# Patient Record
Sex: MALE | Race: BLACK OR AFRICAN AMERICAN
[De-identification: names, ages, dates, MRNs, and addresses within clinical notes are randomized per-mention and may not be internally consistent; named-entity substitution may affect disease eponyms.]

---

## 2018-01-19 LAB
BASOPHILS # BLD AUTO: 0.1 10*3/UL (ref 0–0.1)
BASOPHILS NFR BLD AUTO: 1.1 % (ref 0–1)
DEPRECATED NEUTROPHILS # BLD AUTO: 4 10*3/UL (ref 2.1–6.9)
EOSINOPHIL # BLD AUTO: 0.5 10*3/UL (ref 0–0.4)
EOSINOPHIL NFR BLD AUTO: 6.2 % (ref 0–6)
ERYTHROCYTE [DISTWIDTH] IN CORD BLOOD: 13.2 % (ref 11.7–14.4)
HCT VFR BLD AUTO: 49.5 % (ref 38.2–49.6)
HGB BLD-MCNC: 16.4 G/DL (ref 14–18)
LYMPHOCYTES # BLD: 2.2 10*3/UL (ref 1–3.2)
LYMPHOCYTES NFR BLD AUTO: 29.7 % (ref 18–39.1)
MCH RBC QN AUTO: 29.8 PG (ref 28–32)
MCHC RBC AUTO-ENTMCNC: 33.1 G/DL (ref 31–35)
MCV RBC AUTO: 90 FL (ref 81–99)
MONOCYTES # BLD AUTO: 0.6 10*3/UL (ref 0.2–0.8)
MONOCYTES NFR BLD AUTO: 8.1 % (ref 4.4–11.3)
NEUTS SEG NFR BLD AUTO: 54.6 % (ref 38.7–80)
PLATELET # BLD AUTO: 193 X10E3/UL (ref 140–360)
RBC # BLD AUTO: 5.5 X10E6/UL (ref 4.3–5.7)

## 2018-01-24 ENCOUNTER — HOSPITAL ENCOUNTER (OUTPATIENT)
Dept: HOSPITAL 88 - OR | Age: 73
Discharge: HOME | End: 2018-01-24
Attending: INTERNAL MEDICINE
Payer: MEDICARE

## 2018-01-24 DIAGNOSIS — K31.89: ICD-10-CM

## 2018-01-24 DIAGNOSIS — K44.9: ICD-10-CM

## 2018-01-24 DIAGNOSIS — K29.40: ICD-10-CM

## 2018-01-24 DIAGNOSIS — C82.93: Primary | ICD-10-CM

## 2018-01-24 DIAGNOSIS — K25.9: ICD-10-CM

## 2018-01-24 DIAGNOSIS — Z01.810: ICD-10-CM

## 2018-01-24 DIAGNOSIS — Z87.891: ICD-10-CM

## 2018-01-24 DIAGNOSIS — E11.9: ICD-10-CM

## 2018-01-24 DIAGNOSIS — I10: ICD-10-CM

## 2018-01-24 DIAGNOSIS — Z01.812: ICD-10-CM

## 2018-01-24 DIAGNOSIS — Z79.82: ICD-10-CM

## 2018-01-24 PROCEDURE — 85025 COMPLETE CBC W/AUTO DIFF WBC: CPT

## 2018-01-24 PROCEDURE — 82948 REAGENT STRIP/BLOOD GLUCOSE: CPT

## 2018-01-24 PROCEDURE — 43239 EGD BIOPSY SINGLE/MULTIPLE: CPT

## 2018-01-24 PROCEDURE — 36415 COLL VENOUS BLD VENIPUNCTURE: CPT

## 2018-01-24 PROCEDURE — 93005 ELECTROCARDIOGRAM TRACING: CPT

## 2018-01-24 NOTE — OPERATIVE REPORT
DATE OF PROCEDURE:  January 24, 2018 



REFERRING PHYSICIAN:  Dr. Dallas Balderas 



PROCEDURE PERFORMED:  Esophagogastroduodenoscopy with biopsies.



INDICATIONS FOR PROCEDURE:  History of follicular lymphoma, duodenum.



MEDICATION:  Patient was done under MAC.  Please see anesthesiologist's 

note.



PROCEDURE:  With the patient in the left lateral decubitus position, the 

flexible fiberoptic Olympus gastroscope was introduced into the esophagus 

under direct visualization without any difficulty.  The esophagus overall 

appeared to be within normal limits.  The scope was then advanced with ease 

into the stomach, traversing a small sliding hiatal hernia.  The mucosa 

overlying the antrum revealed some patchy atrophic changes.  Biopsies were 

obtained.  A minute ulcer was also noted in the antrum without active 

bleeding, and that was biopsied.  The pylorus appeared to be of normal 

contour and shape.  The scope was then advanced with ease all the way to 

the 2nd portion of the duodenum.  It was then withdrawn slowly, and the 

previously described clusters of minute nodules appeared to have resolved.  

Three minute submucosal nodules were noted, and those were biopsied.  Two 

additional minute separate nodules were also biopsied.  The rest of the 

duodenum grossly appeared to be within normal limits.  The scope was then 

withdrawn back into the stomach and retroflexed.  Mucosa overlying the 

fundus and the cardia appeared to be within normal limits.  The scope was 

then straightened out.  Stomach was decompressed.  Scope was subsequently 

withdrawn.  Patient tolerated the procedure well.



IMPRESSION

1. Normal esophagus.

2. Small sliding hiatal hernia.

3. Patchy, atrophic gastritis, antrum, biopsied.

4. Antral ulcer, minute, biopsied.  

5. Minute submucosal nodules, proximal 2nd portion of duodenum, 

biopsied.

6. The previously described clusters of nodules appeared to have 

resolved.



PLAN:  Follow up histology.  Increase Protonix to 40 mg 1 p.o. a.c. b.i.d.  

 









DD:  01/24/2018 08:55

DT:  01/24/2018 09:24

Job#:  [id number] 



cc:DALLAS BALDERAS MD

## 2019-04-23 LAB
BASOPHILS # BLD AUTO: 0.1 10*3/UL (ref 0–0.1)
BASOPHILS NFR BLD AUTO: 1 % (ref 0–1)
DEPRECATED NEUTROPHILS # BLD AUTO: 3 10*3/UL (ref 2.1–6.9)
EOSINOPHIL # BLD AUTO: 0.6 10*3/UL (ref 0–0.4)
EOSINOPHIL NFR BLD AUTO: 9.9 % (ref 0–6)
ERYTHROCYTE [DISTWIDTH] IN CORD BLOOD: 13.5 % (ref 11.7–14.4)
HCT VFR BLD AUTO: 43.6 % (ref 38.2–49.6)
HGB BLD-MCNC: 14.2 G/DL (ref 14–18)
LYMPHOCYTES # BLD: 1.8 10*3/UL (ref 1–3.2)
LYMPHOCYTES NFR BLD AUTO: 30.3 % (ref 18–39.1)
MCH RBC QN AUTO: 29 PG (ref 28–32)
MCHC RBC AUTO-ENTMCNC: 32.6 G/DL (ref 31–35)
MCV RBC AUTO: 89.2 FL (ref 81–99)
MONOCYTES # BLD AUTO: 0.6 10*3/UL (ref 0.2–0.8)
MONOCYTES NFR BLD AUTO: 9.2 % (ref 4.4–11.3)
NEUTS SEG NFR BLD AUTO: 49.4 % (ref 38.7–80)
PLATELET # BLD AUTO: 170 X10E3/UL (ref 140–360)
RBC # BLD AUTO: 4.89 X10E6/UL (ref 4.3–5.7)

## 2019-04-25 ENCOUNTER — HOSPITAL ENCOUNTER (OUTPATIENT)
Dept: HOSPITAL 88 - OR | Age: 74
Discharge: HOME | End: 2019-04-25
Attending: INTERNAL MEDICINE
Payer: MEDICARE

## 2019-04-25 VITALS — DIASTOLIC BLOOD PRESSURE: 89 MMHG | SYSTOLIC BLOOD PRESSURE: 135 MMHG

## 2019-04-25 DIAGNOSIS — Z79.84: ICD-10-CM

## 2019-04-25 DIAGNOSIS — Z08: Primary | ICD-10-CM

## 2019-04-25 DIAGNOSIS — Z85.72: ICD-10-CM

## 2019-04-25 DIAGNOSIS — K31.89: ICD-10-CM

## 2019-04-25 DIAGNOSIS — Z79.82: ICD-10-CM

## 2019-04-25 DIAGNOSIS — I10: ICD-10-CM

## 2019-04-25 DIAGNOSIS — E11.9: ICD-10-CM

## 2019-04-25 DIAGNOSIS — Z01.810: ICD-10-CM

## 2019-04-25 DIAGNOSIS — K29.40: ICD-10-CM

## 2019-04-25 DIAGNOSIS — Z01.812: ICD-10-CM

## 2019-04-25 DIAGNOSIS — K20.9: ICD-10-CM

## 2019-04-25 DIAGNOSIS — E78.6: ICD-10-CM

## 2019-04-25 DIAGNOSIS — Z87.891: ICD-10-CM

## 2019-04-25 DIAGNOSIS — K25.9: ICD-10-CM

## 2019-04-25 PROCEDURE — 93005 ELECTROCARDIOGRAM TRACING: CPT

## 2019-04-25 PROCEDURE — 43239 EGD BIOPSY SINGLE/MULTIPLE: CPT

## 2019-04-25 PROCEDURE — 88312 SPECIAL STAINS GROUP 1: CPT

## 2019-04-25 PROCEDURE — 36415 COLL VENOUS BLD VENIPUNCTURE: CPT

## 2019-04-25 PROCEDURE — 82948 REAGENT STRIP/BLOOD GLUCOSE: CPT

## 2019-04-25 PROCEDURE — 88305 TISSUE EXAM BY PATHOLOGIST: CPT

## 2019-04-25 PROCEDURE — 85025 COMPLETE CBC W/AUTO DIFF WBC: CPT

## 2019-04-25 NOTE — OPERATIVE REPORT
DATE OF PROCEDURE:  04/25/2019

 

SURGEON:  Danial Zambrano MD

 

PROCEDURE:  Esophagogastroduodenoscopy with biopsies.

 

INDICATIONS FOR EGD:  History of follicular lymphoma, duodenum.

 

MEDICATION:  The patient was done under MAC, please see anesthesiologist's note.

 

PROCEDURE IN DETAIL:  With the patient in left lateral decubitus position, a flexible

fiberoptic Olympus gastroscope was introduced into the esophagus under direct

visualization without any difficulty.  There was some patchy erythema noted in distal

esophagus.  The scope was then advanced with ease into the stomach and the mucosa

overlying the antrum was somewhat atrophic and that was biopsied.  A minute submucosal

nodule was noted in the antrum that was also biopsied.  An additional submucosal nodule

was noted in the upper body along the anterior wall and that was biopsied.  The pylorus

was intubated with ease and the scope was advanced all the way to the second portion of

the duodenum.  A minute nodule was noted in the distal second portion and that was

biopsied.  There was a focally raised mucosa in the proximal second portion just beyond

the duodenal bulb and that was biopsied.  The scope was then withdrawn back into the

stomach and retroflexed and mucosa overlying the fundus and the cardia appeared to be

within normal limits.  The scope was then straightened out, it was subsequently

withdrawn.  The patient tolerated procedure well. 

 

IMPRESSION:  

1. Distal esophagitis, mild.

2. Submucosal nodule, proximal body, anterior wall, biopsied.

3. Submucosal nodule, antrum, biopsied.

4. Rule out atrophic gastritis, antrum.

5. Focal raised area in proximal second portion just beyond the duodenal bulb, biopsied.

6. Minute submucosal nodule in distal second portion, biopsied.

 

PLAN:  Follow up histology.  Initiate Protonix 40 mg one p.o. q.a.m. a.c.

 

 

 

 

______________________________

Danial Zambrano MD

 

[facility] [place]

D:  04/25/2019 11:26:08

T:  04/25/2019 17:16:23

Job #:  [id number]

 

cc:            MD Jamil Salazar MD

## 2020-07-10 LAB
BASOPHILS # BLD AUTO: 0.1 10*3/UL (ref 0–0.1)
BASOPHILS NFR BLD AUTO: 0.6 % (ref 0–1)
DEPRECATED NEUTROPHILS # BLD AUTO: 4.2 10*3/UL (ref 2.1–6.9)
EOSINOPHIL # BLD AUTO: 0.9 10*3/UL (ref 0–0.4)
EOSINOPHIL NFR BLD AUTO: 10.2 % (ref 0–6)
ERYTHROCYTE [DISTWIDTH] IN CORD BLOOD: 13.4 % (ref 11.7–14.4)
HCT VFR BLD AUTO: 45.7 % (ref 38.2–49.6)
HGB BLD-MCNC: 14.7 G/DL (ref 14–18)
LYMPHOCYTES # BLD: 2.6 10*3/UL (ref 1–3.2)
LYMPHOCYTES NFR BLD AUTO: 30.4 % (ref 18–39.1)
MCH RBC QN AUTO: 28.5 PG (ref 28–32)
MCHC RBC AUTO-ENTMCNC: 32.2 G/DL (ref 31–35)
MCV RBC AUTO: 88.7 FL (ref 81–99)
MONOCYTES # BLD AUTO: 0.8 10*3/UL (ref 0.2–0.8)
MONOCYTES NFR BLD AUTO: 9.4 % (ref 4.4–11.3)
NEUTS SEG NFR BLD AUTO: 49.2 % (ref 38.7–80)
PLATELET # BLD AUTO: 182 X10E3/UL (ref 140–360)
RBC # BLD AUTO: 5.15 X10E6/UL (ref 4.3–5.7)

## 2020-07-16 ENCOUNTER — HOSPITAL ENCOUNTER (OUTPATIENT)
Dept: HOSPITAL 88 - OR | Age: 75
Discharge: HOME | End: 2020-07-16
Attending: INTERNAL MEDICINE
Payer: MEDICARE

## 2020-07-16 VITALS — DIASTOLIC BLOOD PRESSURE: 88 MMHG | SYSTOLIC BLOOD PRESSURE: 132 MMHG

## 2020-07-16 DIAGNOSIS — K29.50: ICD-10-CM

## 2020-07-16 DIAGNOSIS — I45.10: ICD-10-CM

## 2020-07-16 DIAGNOSIS — K29.60: ICD-10-CM

## 2020-07-16 DIAGNOSIS — D12.4: ICD-10-CM

## 2020-07-16 DIAGNOSIS — Z11.59: ICD-10-CM

## 2020-07-16 DIAGNOSIS — K25.9: ICD-10-CM

## 2020-07-16 DIAGNOSIS — Z01.810: ICD-10-CM

## 2020-07-16 DIAGNOSIS — Z79.84: ICD-10-CM

## 2020-07-16 DIAGNOSIS — K64.8: ICD-10-CM

## 2020-07-16 DIAGNOSIS — I10: ICD-10-CM

## 2020-07-16 DIAGNOSIS — E78.00: ICD-10-CM

## 2020-07-16 DIAGNOSIS — K31.89: ICD-10-CM

## 2020-07-16 DIAGNOSIS — Z87.891: ICD-10-CM

## 2020-07-16 DIAGNOSIS — Z01.812: ICD-10-CM

## 2020-07-16 DIAGNOSIS — E11.9: ICD-10-CM

## 2020-07-16 DIAGNOSIS — Z79.82: ICD-10-CM

## 2020-07-16 DIAGNOSIS — K57.30: ICD-10-CM

## 2020-07-16 DIAGNOSIS — Z85.72: ICD-10-CM

## 2020-07-16 DIAGNOSIS — Z08: Primary | ICD-10-CM

## 2020-07-16 PROCEDURE — 82948 REAGENT STRIP/BLOOD GLUCOSE: CPT

## 2020-07-16 PROCEDURE — 88305 TISSUE EXAM BY PATHOLOGIST: CPT

## 2020-07-16 PROCEDURE — 45378 DIAGNOSTIC COLONOSCOPY: CPT

## 2020-07-16 PROCEDURE — 85025 COMPLETE CBC W/AUTO DIFF WBC: CPT

## 2020-07-16 PROCEDURE — 93005 ELECTROCARDIOGRAM TRACING: CPT

## 2020-07-16 PROCEDURE — 36415 COLL VENOUS BLD VENIPUNCTURE: CPT

## 2020-07-16 PROCEDURE — 43239 EGD BIOPSY SINGLE/MULTIPLE: CPT

## 2020-07-16 PROCEDURE — 88312 SPECIAL STAINS GROUP 1: CPT

## 2020-07-16 PROCEDURE — 45385 COLONOSCOPY W/LESION REMOVAL: CPT

## 2020-07-16 NOTE — OPERATIVE REPORT
DATE OF PROCEDURE:  07/16/2020

 

SURGEON:  Danial Zambrano MD

 

PROCEDURE:  EGD with biopsies and colonoscopy with polypectomy.

 

REFERRING PHYSICIANS:  

1. Dr. Felix.

2. Dr. Jalloh.

 

INDICATIONS FOR EGD:  History of follicular lymphoma.

 

INDICATIONS FOR COLONOSCOPY:  Surveillance colonoscopy, personal history of personal

history of colon polyps. 

 

MEDICATIONS:  The patient was done under MAC.  Please see anesthesiologist's note.

 

PROCEDURE IN DETAIL:  With the patient in left lateral decubitus position, a flexible

fiberoptic Olympus gastroscope was introduced into the esophagus under direct

visualization without any difficulty.  The esophagus appeared to be within normal

limits.  The scope was then advanced with ease into the stomach and the mucosa overlying

the antrum was somewhat atrophic and biopsies were obtained.  An approximately 4 mm

ulcer with heaped up margins was noted in the peripyloric area without active bleeding

or stigmata of recent hemorrhage that was biopsied.  The scope was then advanced with

ease into the second portion of the duodenum or was then withdrawn slowly and to minute

nodules were noted in the proximal second portion and biopsies were obtained.  There was

also focal raised area along the anterior wall of the duodenal bulb that was biopsied.

The scope was then withdrawn back into the stomach and retroflexed mucosa overlying the

fundus and cardia appeared to be within normal limits.  The scope was then straightened

out, it was subsequently withdrawn.  The patient tolerated the procedure well. 

 

IMPRESSION:  

1. Normal esophagus.

2. Rule out atrophic gastritis, antrum.

3. Peripyloric ulcer approximately 4 mm in diameter with heaped up margins without

active bleeding or stigmata of recent hemorrhage, biopsied. 

4. Focal raised area of duodenal bulb, anterior wall, biopsied.

5. Minute nodule, proximal second portion, biopsied.

 

PLAN:  Follow up histology.  Initiate Protonix 40 mg one p.o. q.a.m. a.c. 

 

The patient was then turned around.  After adequate lubrication of the anal canal, a

flexible fiberoptic Olympus colonoscope was inserted into the rectum with ease and

advanced all the way to the cecum.  The scope was then withdrawn slowly mucosa overlying

the cecum.  The ascending colon grossly appeared to be within normal limits.  One polyp

was hot snared from the transverse colon.  Another polyp was hot snared from the

descending colon.  Some diverticular disease was noted in the distal descending and the

sigmoid.  The rectum appeared to be within normal limits.  The scope was then

retroflexed into the distal rectum.  Small internal hemorrhoids were noted, none of

which was actively bleeding.  The scope was then straightened out, it was subsequently

withdrawn patient tolerated procedure well. 

 

IMPRESSION:  

1. Transverse colon polyp, hot snared.

2. Descending colon polyp, hot snared.

3. Diverticulosis.

4. Internal hemorrhoids, none actively bleeding.

 

PLAN:  Followup histology.  Initiate high-fiber, low-fat diet.  Initiate high-fiber

supplement.  The patient might benefit from a followup colonoscopy in 3 years. 

 

 

 

 

______________________________

Danial Zambrano MD

 

[facility] [place]

D:  07/16/2020 14:12:51

T:  07/16/2020 17:55:56

Job #:  [id number]

 

cc:            Dr. Isidro Alvares MD

## 2024-04-29 NOTE — OPERATIVE REPORT
DATE OF PROCEDURE:  January 24, 2018 



ADDENDUM



Send copy of report to Dr. Marilin Alvares.









DD:  01/24/2018 08:58

DT:  01/24/2018 09:25

Job#:  [id number] [place] Patient